# Patient Record
Sex: MALE | Race: WHITE | HISPANIC OR LATINO | Employment: FULL TIME | URBAN - METROPOLITAN AREA
[De-identification: names, ages, dates, MRNs, and addresses within clinical notes are randomized per-mention and may not be internally consistent; named-entity substitution may affect disease eponyms.]

---

## 2023-03-25 ENCOUNTER — APPOINTMENT (OUTPATIENT)
Dept: RADIOLOGY | Facility: CLINIC | Age: 28
End: 2023-03-25

## 2023-03-25 VITALS
WEIGHT: 230.6 LBS | DIASTOLIC BLOOD PRESSURE: 85 MMHG | HEART RATE: 63 BPM | HEIGHT: 72 IN | SYSTOLIC BLOOD PRESSURE: 125 MMHG | BODY MASS INDEX: 31.23 KG/M2

## 2023-03-25 DIAGNOSIS — M25.531 RIGHT WRIST PAIN: ICD-10-CM

## 2023-03-25 DIAGNOSIS — M25.532 LEFT WRIST PAIN: ICD-10-CM

## 2023-03-25 DIAGNOSIS — M54.50 LOW BACK PAIN, UNSPECIFIED BACK PAIN LATERALITY, UNSPECIFIED CHRONICITY, UNSPECIFIED WHETHER SCIATICA PRESENT: ICD-10-CM

## 2023-03-25 DIAGNOSIS — S69.91XA INJURY OF RIGHT WRIST, INITIAL ENCOUNTER: Primary | ICD-10-CM

## 2023-03-25 NOTE — LETTER
March 25, 2023     Patient: José Luis Ervin  YOB: 1995  Date of Visit: 3/25/2023      To Whom it May Concern:    José Luis Ervin is under my professional care  Tian Edgar was seen in my office on 3/25/2023  Asmita Hobson may return to work on 3/25/23  I recommend light duty restrictions to include no use of right hand and upper extremity  Patient to be reevaluated after upcoming right wrist MRI for his work status  If you have any questions or concerns, please don't hesitate to call           Sincerely,          Jacque Montes III, DO        CC: No Recipients

## 2023-03-25 NOTE — PATIENT INSTRUCTIONS
Start physical therapy and consider treatment for back pain  May take ibuprofen and Tylenol as needed per instructions below    For patient's right wrist I explained that he could have occult scaphoid fracture and as such I recommended MRI evaluation  I also provided patient with wrist brace and restrictions for work to not use the right hand  Educated risks of mixing NSAIDS ( (non-steroidal anti-inflammatory pills including advil, ibuprofen, motrin, meloxicam, celecoxib, aleve, naproxen, and aspirin containing products) with each other or with steroids (such as prednisone, medrol)  Explained risks of mixing these medications including stomach ulcer, severe internal bleeding, and kidney failure  Instructed not to take NSAIDS if have history of stomach ulcers, kidney issues, or uncontrolled hypertension  Instructed patient to use only one brand as prescribed  For naproxen, a maximum of 500 mg per dose every 12 hours and no more than two doses or 1,000mg per day  For Ibuprofen, a maximum of 800 mg per dose every 6 hours but no more than 3 doses or 2,400 mg per day  Never take these medications together  Never take these medications the same day  For severe pain and only if you have no liver problems, you may add Tylenol (also known as acetaminophen) maximum of 1,000  Mg per dose every 6 hours but no more 3 doses or 3,000 mg per day  Patient expressed understanding and agreed to plan

## 2023-03-25 NOTE — PROGRESS NOTES
1  Injury of right wrist, initial encounter  XR wrist 2 vw right    MRI wrist right wo contrast    Brace      2  Left wrist pain  XR wrist 2 vw left      3  Low back pain, unspecified back pain laterality, unspecified chronicity, unspecified whether sciatica present  XR spine lumbar 2 or 3 views injury    Ambulatory referral to Physical Therapy        Orders Placed This Encounter   Procedures   • Brace   • XR spine lumbar 2 or 3 views injury   • XR wrist 2 vw right   • XR wrist 2 vw left   • MRI wrist right wo contrast   • Ambulatory referral to Physical Therapy        IMAGING STUDIES: (I personally reviewed images in PACS and report):   xray right wrist 3/25/23: no acute abnormality  Xray left wrist 3/25/23: no acute abnormality  Xray lumbar spine 3/25/23: no acute abnormality      PAST REPORTS:        ASSESSMENT/PLAN:  Bilateral Wrist Injury s/p Fall  Right > Left Wrist pain  Right snuffbox tenderness  DDX  Right occult scaphoid  Left contusion    Non-radicular back pain strain    DOI: 2/25/23- Work injury fall- [employer]      Repeat X-ray next visit: None    Return for Follow-up after MRI is completed for review  Patient Instructions   Start physical therapy and consider treatment for back pain  May take ibuprofen and Tylenol as needed per instructions below    For patient's right wrist I explained that he could have occult scaphoid fracture and as such I recommended MRI evaluation  I also provided patient with wrist brace and restrictions for work to not use the right hand  Educated risks of mixing NSAIDS ( (non-steroidal anti-inflammatory pills including advil, ibuprofen, motrin, meloxicam, celecoxib, aleve, naproxen, and aspirin containing products) with each other or with steroids (such as prednisone, medrol)  Explained risks of mixing these medications including stomach ulcer, severe internal bleeding, and kidney failure   Instructed not to take NSAIDS if have history of stomach ulcers, kidney issues, or uncontrolled hypertension  Instructed patient to use only one brand as prescribed  For naproxen, a maximum of 500 mg per dose every 12 hours and no more than two doses or 1,000mg per day  For Ibuprofen, a maximum of 800 mg per dose every 6 hours but no more than 3 doses or 2,400 mg per day  Never take these medications together  Never take these medications the same day  For severe pain and only if you have no liver problems, you may add Tylenol (also known as acetaminophen) maximum of 1,000  Mg per dose every 6 hours but no more 3 doses or 3,000 mg per day  Patient expressed understanding and agreed to plan             __________________________________________________________________________    HISTORY OF PRESENT ILLNESS:    Evaluation of bilateral wrist pain right greater than left after injury on February 25, 2023 when patient slipped and fell landing on outstretched hands  Patient points to Volar aspect of the wrist bilaterally  Denies any pain at rest   Complains of mild intermittent pain exacerbated by lifting boxes moving objects at work  Complains of low back pain bilaterally after fall moderate intensity intermittent exacerbated by working and lifting  Patient had 1 day of left leg pain which then resolved spontaneously  Denies any paresthesias of the lower extremities  No improvement since his initial injury event  No physical therapy  Patient did report his injury to work and tells me that this is under [employer]  Patient denies personal history of cancer, saddle anesthesia, bowel or bladder incontinence, fevers, unintentional weight loss          Review of Systems      Following history reviewed and update:    History reviewed  No pertinent past medical history  History reviewed  No pertinent surgical history    Social History   Social History     Substance and Sexual Activity   Alcohol Use None     Social History     Substance and Sexual Activity   Drug Use Not on file Social History     Tobacco Use   Smoking Status Never   Smokeless Tobacco Never     History reviewed  No pertinent family history  No Known Allergies       Physical Exam  /85   Pulse 63   Ht 6' (1 829 m)   Wt 105 kg (230 lb 9 6 oz)   BMI 31 27 kg/m²     Constitutional:  see vital signs  Gen: well-developed, normocephalic/atraumatic, well-groomed  Eyes: No inflammation or discharge of conjunctiva or lids; sclera clear   Pharynx: no inflammation, lesion, or mass of lips  Neck: supple, no masses, non-distended  MSK: no inflammation, lesion, mass, or clubbing of nails and digits except for other than mentioned below  SKIN: no visible rashes or skin lesions  Pulmonary/Chest: Effort normal  No respiratory distress     NEURO: cranial nerves grossly intact  PSYCH:  Alert and oriented to person, place, and time; recent and remote memory intact; mood normal, no depression, anxiety, or agitation, judgment and insight good and intact     Ortho Exam  BACK EXAM:  BACK TENDERNESS:  Spinous Processes: no  Paraspinal Muscles: lumbar bilateral       DERMATOMAL SENSATION:  L1: normal   L2: normal   L3: normal   L4: normal   L5: normal   S1: normal    STRENGTH (bilateral):  Knee Extension: 5/5  Foot Dorsiflexion: 5/5  Great Toe Extension: 5/5  Foot Plantarflexion: 5/5  Hip Flexion: 5/5  Hip Abduction: 5/5    BACK:   SUPINE STRAIGHT LEG: negative    RIGHT HIP:  LOG ROLL: negative  JANICE: negative  FADIR: negative    LEFT HIP:  LOG ROLL: negative  JANICE: negative  FADIR: negative      Right Elbow:  no swelling, erythema, or increased warmth  rom full  nontender  no laxity of joint  Cubital tunnel Tinel's test:  Distal Biceps Hook test:    Right Wrist  no swelling, erythema, or increased warmth  rom full  +snuffbox tenderness  no laxity of joint; druj stable  Carpal tunnel compression test:  Phalen's test:  Tinel's carpal tunnel test:    Right Hand  no erythema  Swelling: none  Tenderness: none  rom fingers mcp, pip, dip intact without pain  No digital scissoring or deviation of fingers  no extensor lag  no rotation of fingers  no joint laxity  strenght flexion and extension mcp, pip, dip 5/5  sensation intact  capillary refill intact   Froment sign:  normal  OK sign:  Normal  Thumb extension:  5/5        Left Wrist  no swelling, erythema, or increased warmth  rom full  nontender  no laxity of joint; druj stable  Carpal tunnel compression test:  Phalen's test:  Tinel's carpal tunnel test:    Left Hand  no erythema  Swelling: none  Tenderness: none  rom fingers mcp, pip, dip intact without pain  No digital scissoring or deviation of fingers  no extensor lag  no rotation of fingers  no joint laxity  strenght flexion and extension mcp, pip, dip 5/5  sensation intact  capillary refill intact   Froment sign:  normal  OK sign:  Normal  Thumb extension:  5/5      __________________________________________________________________________  Procedures

## 2023-03-28 ENCOUNTER — TELEPHONE (OUTPATIENT)
Dept: OBGYN CLINIC | Facility: HOSPITAL | Age: 28
End: 2023-03-28

## 2023-03-28 NOTE — TELEPHONE ENCOUNTER
Caller: Say Tripathi from [facility]: [facility]     Reason for call: electronically faxed 3/25 OVN and work status     Call back#:

## 2023-03-29 ENCOUNTER — TELEPHONE (OUTPATIENT)
Dept: OBGYN CLINIC | Facility: HOSPITAL | Age: 28
End: 2023-03-29

## 2023-03-29 NOTE — TELEPHONE ENCOUNTER
Caller: Gabino Serna @ [facility]    Doctor: Cheryl Rodriguez    Reason for call: Can we fax Rx for MRI for auth purposes  Thank you!     Call back#: [phone]    Fax#:  [phone]

## 2023-05-04 ENCOUNTER — TELEPHONE (OUTPATIENT)
Dept: OBGYN CLINIC | Facility: HOSPITAL | Age: 28
End: 2023-05-04

## 2023-05-04 NOTE — TELEPHONE ENCOUNTER
Caller: Cosme Bojorquez with [employer]     Doctor/Office: Meenu/LEXX LANDRY#: NA      What needs to be faxed: MRI script    ATTN to: Cosme Bojorquez    Fax#: [phone]

## 2023-05-22 ENCOUNTER — TELEPHONE (OUTPATIENT)
Dept: OBGYN CLINIC | Facility: OTHER | Age: 28
End: 2023-05-22

## 2023-06-23 ENCOUNTER — OFFICE VISIT (OUTPATIENT)
Dept: OBGYN CLINIC | Facility: OTHER | Age: 28
End: 2023-06-23
Payer: OTHER MISCELLANEOUS

## 2023-06-23 VITALS
WEIGHT: 223 LBS | DIASTOLIC BLOOD PRESSURE: 89 MMHG | HEART RATE: 85 BPM | SYSTOLIC BLOOD PRESSURE: 141 MMHG | BODY MASS INDEX: 30.2 KG/M2 | HEIGHT: 72 IN

## 2023-06-23 DIAGNOSIS — M25.531 RIGHT WRIST PAIN: Primary | ICD-10-CM

## 2023-06-23 DIAGNOSIS — M25.532 LEFT WRIST PAIN: ICD-10-CM

## 2023-06-23 PROCEDURE — 99213 OFFICE O/P EST LOW 20 MIN: CPT | Performed by: ORTHOPAEDIC SURGERY

## 2023-06-23 NOTE — LETTER
June 23, 2023     Patient: Yamilet Santos  YOB: 1995  Date of Visit: 6/23/2023      To Whom it May Concern:    Yamilet Santos is under my professional care  Franko Garcia was seen in my office on 6/23/2023  Franko Garcia may return to work on 6/24/23 without restrictions   If you have any questions or concerns, please don't hesitate to call           Sincerely,          Carleen Bradley MD        CC: No Recipients

## 2023-06-23 NOTE — PROGRESS NOTES
"Chief Complaint: Wrist pain    HPI:    Sudhakar Mendez is a 29year old Male who presents today for follow up of bilateral wrist pain  Description of symptoms: Patient presents today along with his accompanying wife for follow-up evaluation  This is a Workmen's Compensation injury case with date of injury 2/25/2023  Last visit to clinic was with our primary care sports medicine colleague Dr Chikis Peres on 3/25/2023  At his last visit, patient had complained of wrist pain and had an exam concerning for snuffbox tenderness, for which an MRI was requested  Patient received MRI of his right wrist on 5/11/2023 with results read as unremarkable with minimal second dorsal compartment tendinitis  Today, patient states that he has no pain in either wrist at rest   He states he does suffer from very mild/minimal pain at the base of his right wrist on the hyperthenar side that is intermittent and worse when squeezing the handle of a paintball gun  He denies any new injuries  He states he has been back to work and endorses very minimal intermittent episodes of discomfort  He states he is overall improving significantly  He was referred to formal physical therapy at his last visit but has not been evaluated by physical therapy as of yet because he was waiting for discussion regarding his MRI results  Summary of treatment to-date: As outlined above and in previous notes  I have personally reviewed pertinent films in PACS and my interpretation is MRI of the right wrist obtained at outside institution 5/11/2023 with formal impression of: \"Unremarkable MRI of the right wrist   There is no acute fracture  \"  Patient Active Problem List   Diagnosis   (none) - all problems resolved or deleted        No current outpatient medications on file prior to visit  No current facility-administered medications on file prior to visit          No Known Allergies     Tobacco Use: Low Risk  (6/23/2023)    Patient History " • Smoking Tobacco Use: Never    • Smokeless Tobacco Use: Never    • Passive Exposure: Not on file        Social Determinants of Health     Tobacco Use: Low Risk  (6/23/2023)    Patient History    • Smoking Tobacco Use: Never    • Smokeless Tobacco Use: Never    • Passive Exposure: Not on file   Alcohol Use: Not on file   Financial Resource Strain: Not on file   Food Insecurity: Not on file   Transportation Needs: Not on file   Physical Activity: Not on file   Stress: Not on file   Social Connections: Not on file   Intimate Partner Violence: Not on file   Depression: Not on file   Housing Stability: Not on file        Review of Systems     Body mass index is 30 24 kg/m²  Physical Exam     Ortho Exam:  RIGHT WRIST:  no swelling, erythema, or increased warmth  rom full  nontender  no laxity of joint; druj stable  Wrist strength flexion:  5/5  Wrist strength extension:  5/5  Finklestein's Test: negative  Tinel's test median nerve:  Negative  Carpal tunnel compression test:  Negative  Phalen's wrist flexion test:  Negative  Reverse Phalen's wrist extension test:  Negative  Scapholunate ballottement test:  Lunotriquetral ballottement test:    RIGHT HAND:  no erythema  Swelling:  None  Tenderness:  None  rom fingers mcp, pip, dip intact without pain  No digital scissoring or deviation of fingers  no extensor lag  no rotation of fingers  no joint laxity  strenght flexion and extension mcp, pip, dip 5/5  Wartenberg sign (1st dorsal interosseous ulnar nerve):   No abduction of small finger  sensation intact  capillary refill intact     RIGHT THUMB:  Erythema: no  Swelling: no  Increased Warmth: no  Tenderness: none  ROM: intact flexion, extension  Malrotation: none; thumb perpendicular to remainig phalanges on adduction  Varus stress: no laxity or pain  Valgus stress: no laxity or pain   Distal Phalanx Strength (median n ): 5/5 flexion, extension  Proximal Phalanx Strength (ulnar n ): 5/5 flexion, extension  Thumb Abduction strength (median n ): 5/5  Thumb Adduction strength (ulnar n): 5/5  OK sign:  Able to form full [place] without triangle  Froment Sign:  No compensatory [employer] median pincer like flexion    LEFT WRIST:  no swelling, erythema, or increased warmth  rom full  nontender  no laxity of joint; druj stable  Wrist strength flexion:  5/5  Wrist strength extension:  5/5  Finklestein's Test: negative  Tinel's test median nerve:  Negative  Carpal tunnel compression test:  Negative  Phalen's wrist flexion test:  Negative  Reverse Phalen's wrist extension test:  Negative  Scapholunate ballottement test:  Lunotriquetral ballottement test:    LEFT HAND:  no erythema  Swelling:  None  Tenderness:  None  rom fingers mcp, pip, dip intact without pain  No digital scissoring or deviation of fingers  no extensor lag  no rotation of fingers  no joint laxity  strenght flexion and extension mcp, pip, dip 5/5  Wartenberg sign (1st dorsal interosseous ulnar nerve): No abduction of small finger  sensation intact  capillary refill intact     LEFT THUMB:  Erythema: no  Swelling: no  Increased Warmth: no  Tenderness: none  ROM: intact flexion, extension  Malrotation: none; thumb perpendicular to remainig phalanges on adduction  Varus stress: no laxity or pain  Valgus stress: no laxity or pain   Distal Phalanx Strength (median n ): 5/5 flexion, extension  Proximal Phalanx Strength (ulnar n ): 5/5 flexion, extension  Thumb Abduction strength (median n ): 5/5  Thumb Adduction strength (ulnar n): 5/5  OK sign:  Able to form full [place] without triangle  Froment Sign:  No compensatory [employer] median pincer like flexion        Procedures       Assessment:     Diagnosis ICD-10-CM Associated Orders   1  Right wrist pain  M25 531       2  Left wrist pain  M25 532            Plan: We reviewed our current clinical assessment with Daisy Day and his accompanying wife    We reviewed his imaging, which includes right wrist MRI and reveals resolving "tenosynovitis of the second dorsal compartment with no acute osseous abnormalities  We discussed management options, reviewed natural history, and engaged in shared decision-making  At this time, his right wrist tendinitis appears to have clinically resolved  Additionally, at this time, his left wrist pain has resolved  He has a normal exam of the wrists bilaterally  For any minimal/mild recurrence of discomfort in the wrist, he can trial use of an over-the-counter soft wrist brace as needed for particularly troublesome activities  Given his significant improvement, there is no further need to trial a course of formal physical therapy at this time  The patient can return to work without formal limitations - work note offered and was given indicating same  Patient endorsed understanding and acceptance of the above plan  Follow-Up:    As needed        Portions of the record may have been created with voice recognition software  Occasional wrong word or \"sound alike\" substitutions may have occurred due to the inherent limitations of voice recognition software  Please review the chart carefully and recognize, using context, where substitutions/typographical errors may have occurred           "

## 2024-05-19 NOTE — TELEPHONE ENCOUNTER
Called and spoke to patient  Patient scheduled  Friday June 23rd @ 10:00 am with Dr Flaquita Rdz  Patient aware of date, time and location risk factors